# Patient Record
Sex: FEMALE | Race: BLACK OR AFRICAN AMERICAN | URBAN - METROPOLITAN AREA
[De-identification: names, ages, dates, MRNs, and addresses within clinical notes are randomized per-mention and may not be internally consistent; named-entity substitution may affect disease eponyms.]

---

## 2023-04-14 ENCOUNTER — OFFICE VISIT (OUTPATIENT)
Dept: URBAN - METROPOLITAN AREA CLINIC 118 | Facility: CLINIC | Age: 48
End: 2023-04-14

## 2023-05-30 ENCOUNTER — OFFICE VISIT (OUTPATIENT)
Dept: URBAN - METROPOLITAN AREA CLINIC 92 | Facility: CLINIC | Age: 48
End: 2023-05-30
Payer: COMMERCIAL

## 2023-05-30 ENCOUNTER — WEB ENCOUNTER (OUTPATIENT)
Dept: URBAN - METROPOLITAN AREA CLINIC 92 | Facility: CLINIC | Age: 48
End: 2023-05-30

## 2023-05-30 VITALS
HEIGHT: 65 IN | WEIGHT: 232 LBS | SYSTOLIC BLOOD PRESSURE: 147 MMHG | BODY MASS INDEX: 38.65 KG/M2 | DIASTOLIC BLOOD PRESSURE: 101 MMHG | TEMPERATURE: 97.2 F | HEART RATE: 90 BPM

## 2023-05-30 DIAGNOSIS — Z12.11 COLON CANCER SCREENING: ICD-10-CM

## 2023-05-30 DIAGNOSIS — Z80.0 FAMILY HISTORY- STOMACH CANCER: ICD-10-CM

## 2023-05-30 DIAGNOSIS — A04.8 HELICOBACTER PYLORI INFECTION: ICD-10-CM

## 2023-05-30 PROCEDURE — 99204 OFFICE O/P NEW MOD 45 MIN: CPT | Performed by: INTERNAL MEDICINE

## 2023-05-30 PROCEDURE — 99244 OFF/OP CNSLTJ NEW/EST MOD 40: CPT | Performed by: PHYSICIAN ASSISTANT

## 2023-05-30 PROCEDURE — 99244 OFF/OP CNSLTJ NEW/EST MOD 40: CPT | Performed by: INTERNAL MEDICINE

## 2023-05-30 RX ORDER — IBUPROFEN 800 MG/1
TAKE 1 TABLET BY MOUTH EVERY 6-8 HOURS AS NEEDED TABLET, FILM COATED ORAL
Qty: 20 EACH | Refills: 0 | Status: ON HOLD | COMMUNITY

## 2023-05-30 RX ORDER — BISMUTH SUBSALICYLATE 262 MG/1
2 TABLETS WITH MEALS AND AT BEDTIME TABLET, CHEWABLE ORAL
Qty: 112 TABLET | Refills: 0 | OUTPATIENT
Start: 2023-05-30 | End: 2023-06-13

## 2023-05-30 RX ORDER — OMEPRAZOLE 40 MG/1
1 CAPSULE 30 MINUTES BEFORE MORNING MEAL CAPSULE, DELAYED RELEASE ORAL TWICE A DAY
Qty: 28 CAPSULE | Refills: 0 | OUTPATIENT
Start: 2023-05-30

## 2023-05-30 RX ORDER — FLUCONAZOLE 200 MG/1
TAKE 1 TABLET BY MOUTH AS DIRECTED TAKE ONE TABLET BY MOUTH EVERY 72 HOURS UNTIL FINISHED TABLET ORAL
Qty: 2 EACH | Refills: 0 | Status: ON HOLD | COMMUNITY

## 2023-05-30 RX ORDER — ACETAMINOPHEN AND CODEINE PHOSPHATE 300; 30 MG/1; MG/1
TABLET ORAL
Qty: 15 TABLET | Status: ON HOLD | COMMUNITY

## 2023-05-30 RX ORDER — LOSARTAN POTASSIUM 100 MG/1
TAKE 1 TABLET BY MOUTH EVERY DAY TABLET, FILM COATED ORAL
Qty: 90 EACH | Refills: 0 | Status: ON HOLD | COMMUNITY

## 2023-05-30 RX ORDER — LEVOFLOXACIN 500 MG/1
TAKE 1 TAB BY MOUTH DAILY TABLET, FILM COATED ORAL
Qty: 7 EACH | Refills: 1 | Status: ON HOLD | COMMUNITY

## 2023-05-30 RX ORDER — AMLODIPINE BESYLATE 10 MG/1
TABLET ORAL
Qty: 90 TABLET | Status: ON HOLD | COMMUNITY

## 2023-05-30 RX ORDER — METRONIDAZOLE 500 MG/1
TABLET ORAL
Qty: 14 TABLET | Status: ON HOLD | COMMUNITY

## 2023-05-30 RX ORDER — AMOXICILLIN 500 MG/1
TAKE 1 CAPSULE BY MOUTH 3 TIMES A DAY CAPSULE ORAL
Qty: 21 EACH | Refills: 0 | Status: ON HOLD | COMMUNITY

## 2023-05-30 RX ORDER — PREDNISONE 20 MG/1
TAKE 1 TABLET BY MOUTH EVERY DAY TABLET ORAL
Qty: 7 EACH | Refills: 0 | Status: ON HOLD | COMMUNITY

## 2023-05-30 RX ORDER — METRONIDAZOLE 250 MG/1
1 TABLET TABLET ORAL
Qty: 56 TABLET | Refills: 0 | OUTPATIENT
Start: 2023-05-30 | End: 2023-06-13

## 2023-05-30 RX ORDER — TETRACYCLINE HYDROCHLORIDE 500 MG/1
1 CAPSULE ON AN EMPTY STOMACH CAPSULE ORAL
Qty: 56 CAPSULE | Refills: 0 | OUTPATIENT
Start: 2023-05-30 | End: 2023-06-13

## 2023-05-30 NOTE — HPI-TODAY'S VISIT:
This patient was referred by Dr. Kalyn Garcias for an evaluation of  H. pylori infection.  A copy of this will be sent to the referring provider.  She notes she has had mild discomfort/unsettled stomach for over a year. Symptoms were intermittent, no obvious triggers and no assoc vomiting, anorexia, jaundice, weight loss, dysphagia, change in bowel habits or GIB. Saw PCP in July 2022 had a positive H. pylori breath test. She was treated with triple therapy but infection didn't clear on repeat testing. CBC/CMP WNL aside from elevated glucose.  No prior EGD or colonoscopy. Mom passed with stomach CA 2' HP in 2018 (our pt)

## 2023-06-30 ENCOUNTER — OFFICE VISIT (OUTPATIENT)
Dept: URBAN - METROPOLITAN AREA SURGERY CENTER 16 | Facility: SURGERY CENTER | Age: 48
End: 2023-06-30
Payer: COMMERCIAL

## 2023-06-30 DIAGNOSIS — D12.3 ADENOMA OF TRANSVERSE COLON: ICD-10-CM

## 2023-06-30 DIAGNOSIS — D12.2 ADENOMA OF ASCENDING COLON: ICD-10-CM

## 2023-06-30 DIAGNOSIS — Z12.11 COLON CANCER SCREENING: ICD-10-CM

## 2023-06-30 DIAGNOSIS — B96.81 BACTERIAL INFECTION DUE TO H. PYLORI: ICD-10-CM

## 2023-06-30 DIAGNOSIS — K29.60 ADENOPAPILLOMATOSIS GASTRICA: ICD-10-CM

## 2023-06-30 PROCEDURE — 43239 EGD BIOPSY SINGLE/MULTIPLE: CPT | Performed by: INTERNAL MEDICINE

## 2023-06-30 PROCEDURE — G8907 PT DOC NO EVENTS ON DISCHARG: HCPCS | Performed by: INTERNAL MEDICINE

## 2023-06-30 PROCEDURE — 45380 COLONOSCOPY AND BIOPSY: CPT | Performed by: INTERNAL MEDICINE

## 2023-06-30 RX ORDER — LEVOFLOXACIN 500 MG/1
TAKE 1 TAB BY MOUTH DAILY TABLET, FILM COATED ORAL
Qty: 7 EACH | Refills: 1 | Status: ON HOLD | COMMUNITY

## 2023-06-30 RX ORDER — LOSARTAN POTASSIUM 100 MG/1
TAKE 1 TABLET BY MOUTH EVERY DAY TABLET, FILM COATED ORAL
Qty: 90 EACH | Refills: 0 | Status: ON HOLD | COMMUNITY

## 2023-06-30 RX ORDER — ACETAMINOPHEN AND CODEINE PHOSPHATE 300; 30 MG/1; MG/1
TABLET ORAL
Qty: 15 TABLET | Status: ON HOLD | COMMUNITY

## 2023-06-30 RX ORDER — METRONIDAZOLE 500 MG/1
TABLET ORAL
Qty: 14 TABLET | Status: ON HOLD | COMMUNITY

## 2023-06-30 RX ORDER — OMEPRAZOLE 40 MG/1
1 CAPSULE 30 MINUTES BEFORE MORNING MEAL CAPSULE, DELAYED RELEASE ORAL TWICE A DAY
Qty: 28 CAPSULE | Refills: 0 | Status: ACTIVE | COMMUNITY
Start: 2023-05-30

## 2023-06-30 RX ORDER — FLUCONAZOLE 200 MG/1
TAKE 1 TABLET BY MOUTH AS DIRECTED TAKE ONE TABLET BY MOUTH EVERY 72 HOURS UNTIL FINISHED TABLET ORAL
Qty: 2 EACH | Refills: 0 | Status: ON HOLD | COMMUNITY

## 2023-06-30 RX ORDER — PREDNISONE 20 MG/1
TAKE 1 TABLET BY MOUTH EVERY DAY TABLET ORAL
Qty: 7 EACH | Refills: 0 | Status: ON HOLD | COMMUNITY

## 2023-06-30 RX ORDER — IBUPROFEN 800 MG/1
TAKE 1 TABLET BY MOUTH EVERY 6-8 HOURS AS NEEDED TABLET, FILM COATED ORAL
Qty: 20 EACH | Refills: 0 | Status: ON HOLD | COMMUNITY

## 2023-06-30 RX ORDER — AMLODIPINE BESYLATE 10 MG/1
TABLET ORAL
Qty: 90 TABLET | Status: ON HOLD | COMMUNITY

## 2023-06-30 RX ORDER — AMOXICILLIN 500 MG/1
TAKE 1 CAPSULE BY MOUTH 3 TIMES A DAY CAPSULE ORAL
Qty: 21 EACH | Refills: 0 | Status: ON HOLD | COMMUNITY

## 2023-07-03 ENCOUNTER — TELEPHONE ENCOUNTER (OUTPATIENT)
Dept: URBAN - METROPOLITAN AREA CLINIC 92 | Facility: CLINIC | Age: 48
End: 2023-07-03

## 2023-07-09 PROBLEM — 429047008: Status: ACTIVE | Noted: 2023-07-09

## 2023-07-12 ENCOUNTER — TELEPHONE ENCOUNTER (OUTPATIENT)
Dept: URBAN - METROPOLITAN AREA CLINIC 98 | Facility: CLINIC | Age: 48
End: 2023-07-12

## 2023-07-13 ENCOUNTER — DASHBOARD ENCOUNTERS (OUTPATIENT)
Age: 48
End: 2023-07-13

## 2023-07-13 ENCOUNTER — OFFICE VISIT (OUTPATIENT)
Dept: URBAN - METROPOLITAN AREA CLINIC 92 | Facility: CLINIC | Age: 48
End: 2023-07-13
Payer: COMMERCIAL

## 2023-07-13 ENCOUNTER — WEB ENCOUNTER (OUTPATIENT)
Dept: URBAN - METROPOLITAN AREA CLINIC 92 | Facility: CLINIC | Age: 48
End: 2023-07-13

## 2023-07-13 VITALS
WEIGHT: 235.5 LBS | BODY MASS INDEX: 39.24 KG/M2 | DIASTOLIC BLOOD PRESSURE: 94 MMHG | HEIGHT: 65 IN | SYSTOLIC BLOOD PRESSURE: 146 MMHG | TEMPERATURE: 97.1 F | HEART RATE: 81 BPM

## 2023-07-13 DIAGNOSIS — Z80.0 FAMILY HISTORY- STOMACH CANCER: ICD-10-CM

## 2023-07-13 DIAGNOSIS — Z86.010 HISTORY OF ADENOMATOUS POLYP OF COLON: ICD-10-CM

## 2023-07-13 DIAGNOSIS — A04.8 OTHER SPECIFIED BACTERIAL INTESTINAL INFECTIONS: ICD-10-CM

## 2023-07-13 PROCEDURE — 99214 OFFICE O/P EST MOD 30 MIN: CPT | Performed by: INTERNAL MEDICINE

## 2023-07-13 RX ORDER — AMLODIPINE BESYLATE 10 MG/1
TABLET ORAL
Qty: 90 TABLET | Status: ACTIVE | COMMUNITY

## 2023-07-13 RX ORDER — ACETAMINOPHEN AND CODEINE PHOSPHATE 300; 30 MG/1; MG/1
TABLET ORAL
Qty: 15 TABLET | Status: DISCONTINUED | COMMUNITY

## 2023-07-13 RX ORDER — FLUCONAZOLE 200 MG/1
TAKE 1 TABLET BY MOUTH AS DIRECTED TAKE ONE TABLET BY MOUTH EVERY 72 HOURS UNTIL FINISHED TABLET ORAL
Qty: 2 EACH | Refills: 0 | Status: DISCONTINUED | COMMUNITY

## 2023-07-13 RX ORDER — OMEPRAZOLE 40 MG/1
1 CAPSULE 30 MINUTES BEFORE MORNING MEAL CAPSULE, DELAYED RELEASE ORAL TWICE A DAY
Qty: 28 CAPSULE | Refills: 0 | Status: DISCONTINUED | COMMUNITY
Start: 2023-05-30

## 2023-07-13 RX ORDER — METRONIDAZOLE 250 MG/1
1 TABLET TABLET ORAL
Qty: 56 TABLET | Refills: 0 | OUTPATIENT
Start: 2023-07-13 | End: 2023-07-27

## 2023-07-13 RX ORDER — TETRACYCLINE HYDROCHLORIDE 500 MG/1
1 CAPSULE ON AN EMPTY STOMACH CAPSULE ORAL
Qty: 56 CAPSULE | Refills: 0 | OUTPATIENT
Start: 2023-07-13 | End: 2023-07-27

## 2023-07-13 RX ORDER — PREDNISONE 20 MG/1
TAKE 1 TABLET BY MOUTH EVERY DAY TABLET ORAL
Qty: 7 EACH | Refills: 0 | Status: DISCONTINUED | COMMUNITY

## 2023-07-13 RX ORDER — METRONIDAZOLE 500 MG/1
TABLET ORAL
Qty: 14 TABLET | Status: DISCONTINUED | COMMUNITY

## 2023-07-13 RX ORDER — AMOXICILLIN 500 MG/1
TAKE 1 CAPSULE BY MOUTH 3 TIMES A DAY CAPSULE ORAL
Qty: 21 EACH | Refills: 0 | Status: ACTIVE | COMMUNITY

## 2023-07-13 RX ORDER — LEVOFLOXACIN 500 MG/1
TAKE 1 TAB BY MOUTH DAILY TABLET, FILM COATED ORAL
Qty: 7 EACH | Refills: 1 | Status: DISCONTINUED | COMMUNITY

## 2023-07-13 RX ORDER — LOSARTAN POTASSIUM 100 MG/1
TAKE 1 TABLET BY MOUTH EVERY DAY TABLET, FILM COATED ORAL
Qty: 90 EACH | Refills: 0 | Status: ACTIVE | COMMUNITY

## 2023-07-13 RX ORDER — OMEPRAZOLE 40 MG/1
1 CAPSULE 30M BEFORE MEALS TWICE A DAY CAPSULE, DELAYED RELEASE ORAL TWICE A DAY
Qty: 28 CAPSULE | Refills: 0 | OUTPATIENT
Start: 2023-07-13

## 2023-07-13 RX ORDER — BISMUTH SUBSALICYLATE 262 MG/1
2 TABLETS WITH MEALS AND AT BEDTIME TABLET, CHEWABLE ORAL
Qty: 240 | OUTPATIENT
Start: 2023-07-13 | End: 2023-08-12

## 2023-07-13 RX ORDER — IBUPROFEN 800 MG/1
TAKE 1 TABLET BY MOUTH EVERY 6-8 HOURS AS NEEDED TABLET, FILM COATED ORAL
Qty: 20 EACH | Refills: 0 | Status: ACTIVE | COMMUNITY

## 2025-01-14 ENCOUNTER — TELEPHONE ENCOUNTER (OUTPATIENT)
Dept: URBAN - METROPOLITAN AREA CLINIC 98 | Facility: CLINIC | Age: 50
End: 2025-01-14

## 2025-01-30 ENCOUNTER — OFFICE VISIT (OUTPATIENT)
Dept: URBAN - METROPOLITAN AREA CLINIC 124 | Facility: CLINIC | Age: 50
End: 2025-01-30
Payer: COMMERCIAL

## 2025-01-30 VITALS
HEIGHT: 65 IN | WEIGHT: 234 LBS | DIASTOLIC BLOOD PRESSURE: 103 MMHG | BODY MASS INDEX: 38.99 KG/M2 | HEART RATE: 93 BPM | SYSTOLIC BLOOD PRESSURE: 158 MMHG | TEMPERATURE: 97 F

## 2025-01-30 DIAGNOSIS — K59.09 CHRONIC CONSTIPATION: ICD-10-CM

## 2025-01-30 DIAGNOSIS — R12 HEARTBURN: ICD-10-CM

## 2025-01-30 DIAGNOSIS — Z80.0 FAMILY HISTORY- STOMACH CANCER: ICD-10-CM

## 2025-01-30 DIAGNOSIS — A04.8 HELICOBACTER PYLORI INFECTION: ICD-10-CM

## 2025-01-30 DIAGNOSIS — Z86.0101 HISTORY OF ADENOMATOUS POLYP OF COLON: ICD-10-CM

## 2025-01-30 PROCEDURE — 99213 OFFICE O/P EST LOW 20 MIN: CPT | Performed by: PHYSICIAN ASSISTANT

## 2025-01-30 RX ORDER — LOSARTAN POTASSIUM 100 MG/1
TAKE 1 TABLET BY MOUTH EVERY DAY TABLET, FILM COATED ORAL
Qty: 90 EACH | Refills: 0 | Status: ACTIVE | COMMUNITY

## 2025-01-30 RX ORDER — OMEPRAZOLE 40 MG/1
1 CAPSULE 30M BEFORE MEALS TWICE A DAY CAPSULE, DELAYED RELEASE ORAL TWICE A DAY
Qty: 28 CAPSULE | Refills: 0 | Status: ACTIVE | COMMUNITY
Start: 2023-07-13

## 2025-01-30 RX ORDER — AMLODIPINE BESYLATE 10 MG/1
TABLET ORAL
Qty: 90 TABLET | Status: ACTIVE | COMMUNITY

## 2025-01-30 RX ORDER — IBUPROFEN 800 MG/1
TAKE 1 TABLET BY MOUTH EVERY 6-8 HOURS AS NEEDED TABLET, FILM COATED ORAL
Qty: 20 EACH | Refills: 0 | Status: ACTIVE | COMMUNITY

## 2025-01-30 RX ORDER — OMEPRAZOLE 40 MG/1
1 CAPSULE 30M BEFORE MEALS TWICE A DAY CAPSULE, DELAYED RELEASE ORAL TWICE A DAY
Qty: 28 CAPSULE | Refills: 0 | OUTPATIENT

## 2025-01-30 RX ORDER — AMOXICILLIN 500 MG/1
TAKE 1 CAPSULE BY MOUTH 3 TIMES A DAY CAPSULE ORAL
Qty: 21 EACH | Refills: 0 | Status: DISCONTINUED | COMMUNITY

## 2025-01-30 NOTE — HPI-TODAY'S VISIT:
49yoF seen in 2023 for H. pylori infection. At that time she noted discomfort/unsettled stomach for over a year w/o vomiting, anorexia, jaundice, weight loss, dysphagia, change in bowel habits or GIB. Saw PCP in July 2022 had a positive H. pylori breath test. She was treated with triple therapy but infection didn't clear on repeat testing.  Mom passed with stomach CA 2' HP in 2018 (our pt). No FH colon CA EGD 6/30/2023 granular mucosa in gastric body ow normal, bx chronic active gastritis with HP Colon 6/30/2023 3 small polyps and IH, TA and SSA.  Pt tx with quad therapy in June 2023 and lost to f/u and didnt complete erad testing She uses prn omeprazole for heartburn or regurg, about once-twice/month, triggered by spaghetti or fatty or spicy foods. No N/V, dysphagia, anorexia or unintentional weight loss--on ozempic and has lost 30# (has had some swallowing issues/tongue swelling with peanuts). She has BMs but sometimes hard and has to strain, kobe since ozempic. Some intermittentl mild LLQ pain, unsure but thinks worse with constipation.

## 2025-02-11 ENCOUNTER — LAB OUTSIDE AN ENCOUNTER (OUTPATIENT)
Dept: URBAN - METROPOLITAN AREA CLINIC 92 | Facility: CLINIC | Age: 50
End: 2025-02-11

## 2025-02-14 LAB — H PYLORI BREATH TEST: (no result)

## 2025-02-17 ENCOUNTER — TELEPHONE ENCOUNTER (OUTPATIENT)
Dept: URBAN - METROPOLITAN AREA CLINIC 92 | Facility: CLINIC | Age: 50
End: 2025-02-17

## 2025-03-20 ENCOUNTER — OFFICE VISIT (OUTPATIENT)
Dept: URBAN - METROPOLITAN AREA CLINIC 124 | Facility: CLINIC | Age: 50
End: 2025-03-20

## 2025-03-20 VITALS — HEIGHT: 65 IN

## 2025-03-20 RX ORDER — OMEPRAZOLE 40 MG/1
1 CAPSULE 30M BEFORE MEALS TWICE A DAY CAPSULE, DELAYED RELEASE ORAL TWICE A DAY
Qty: 28 CAPSULE | Refills: 0 | OUTPATIENT

## 2025-03-20 RX ORDER — OMEPRAZOLE 40 MG/1
1 CAPSULE 30M BEFORE MEALS TWICE A DAY CAPSULE, DELAYED RELEASE ORAL TWICE A DAY
Qty: 28 CAPSULE | Refills: 0 | Status: ACTIVE | COMMUNITY

## 2025-03-20 RX ORDER — IBUPROFEN 800 MG/1
TAKE 1 TABLET BY MOUTH EVERY 6-8 HOURS AS NEEDED TABLET, FILM COATED ORAL
Qty: 20 EACH | Refills: 0 | Status: ACTIVE | COMMUNITY

## 2025-03-20 RX ORDER — AMLODIPINE BESYLATE 10 MG/1
TABLET ORAL
Qty: 90 TABLET | Status: ACTIVE | COMMUNITY

## 2025-03-20 RX ORDER — LOSARTAN POTASSIUM 100 MG/1
TAKE 1 TABLET BY MOUTH EVERY DAY TABLET, FILM COATED ORAL
Qty: 90 EACH | Refills: 0 | Status: ACTIVE | COMMUNITY

## 2025-03-20 NOTE — HPI-TODAY'S VISIT:
50yoF seen in 2023 for H. pylori infection. At that time she noted discomfort/unsettled stomach for over a year w/o vomiting, anorexia, jaundice, weight loss, dysphagia, change in bowel habits or GIB. Saw PCP in July 2022 had a positive H. pylori breath test. She was treated with triple therapy but infection didn't clear on repeat testing.  Mom passed with stomach CA 2' HP in 2018 (our pt). No FH colon CA EGD 6/30/2023 granular mucosa in gastric body ow normal, bx chronic active gastritis with HP Colon 6/30/2023 3 small polyps and IH, TA and SSA.  Pt tx with quad therapy in June 2023 and then lost to f/u but seen in Feb and as below eradication testing confirmed eradication.  She uses prn omeprazole for heartburn or regurg, about once-twice/month, triggered by spaghetti or fatty or spicy foods. No N/V, dysphagia, anorexia or unintentional weight loss--on ozempic and has lost 30# (has had some swallowing issues/tongue swelling with peanuts).   She noted BMs most days but hard and has to strain, kobe since ozempic. Some intermittentl mild LLQ pain, unsure but thinks worse with constipation. Advised daily miralax and ? F